# Patient Record
Sex: MALE | Race: WHITE | NOT HISPANIC OR LATINO | Employment: FULL TIME | URBAN - METROPOLITAN AREA
[De-identification: names, ages, dates, MRNs, and addresses within clinical notes are randomized per-mention and may not be internally consistent; named-entity substitution may affect disease eponyms.]

---
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
## 2023-05-26 PROBLEM — S75.009A: Status: ACTIVE | Noted: 2023-05-26

## 2023-05-26 PROBLEM — S82.102B: Status: ACTIVE | Noted: 2023-05-26

## 2023-05-26 PROBLEM — D72.829 LEUKOCYTOSIS: Status: ACTIVE | Noted: 2023-05-26

## 2023-05-26 PROBLEM — E87.6 HYPOKALEMIA: Status: ACTIVE | Noted: 2023-05-26

## 2023-05-28 PROBLEM — R74.8 ELEVATED CK: Status: ACTIVE | Noted: 2023-05-28

## 2023-05-29 PROBLEM — I47.20 VENTRICULAR TACHYCARDIA: Status: ACTIVE | Noted: 2023-05-29

## 2023-05-30 PROBLEM — I26.99 ACUTE PULMONARY EMBOLISM WITHOUT ACUTE COR PULMONALE: Status: ACTIVE | Noted: 2023-05-30

## 2023-05-31 PROBLEM — D72.829 LEUKOCYTOSIS: Status: RESOLVED | Noted: 2023-05-26 | Resolved: 2023-05-31

## 2023-05-31 PROBLEM — R00.0 TACHYCARDIA: Status: ACTIVE | Noted: 2023-05-31

## 2023-05-31 PROBLEM — S75.002A: Status: ACTIVE | Noted: 2023-05-31

## 2023-06-01 PROBLEM — E87.6 HYPOKALEMIA: Status: RESOLVED | Noted: 2023-05-26 | Resolved: 2023-06-01

## 2023-06-01 PROBLEM — R50.9 FEVER: Status: ACTIVE | Noted: 2023-06-01

## 2023-06-02 PROBLEM — K59.03 DRUG-INDUCED CONSTIPATION: Status: ACTIVE | Noted: 2023-06-02

## 2023-06-02 PROBLEM — I47.20 VENTRICULAR TACHYCARDIA: Status: RESOLVED | Noted: 2023-05-29 | Resolved: 2023-06-02

## 2023-06-02 PROBLEM — D72.825 BANDEMIA: Status: ACTIVE | Noted: 2023-06-02

## 2023-06-02 PROBLEM — R74.8 ELEVATED CK: Status: RESOLVED | Noted: 2023-05-28 | Resolved: 2023-06-02

## 2023-06-03 PROBLEM — D62 ACUTE BLOOD LOSS ANEMIA: Status: ACTIVE | Noted: 2023-06-03

## 2023-06-03 PROBLEM — D62 ACUTE BLOOD LOSS ANEMIA: Status: RESOLVED | Noted: 2023-06-03 | Resolved: 2023-06-03

## 2023-06-03 PROBLEM — D75.839 THROMBOCYTOSIS: Status: ACTIVE | Noted: 2023-06-03

## 2023-06-05 PROBLEM — D75.839 THROMBOCYTOSIS: Status: RESOLVED | Noted: 2023-06-03 | Resolved: 2023-06-05

## 2023-06-05 PROBLEM — R50.9 FEVER: Status: RESOLVED | Noted: 2023-06-01 | Resolved: 2023-06-05

## 2023-06-06 ENCOUNTER — PATIENT OUTREACH (OUTPATIENT)
Dept: ADMINISTRATIVE | Facility: CLINIC | Age: 38
End: 2023-06-06

## 2023-06-06 NOTE — PROGRESS NOTES
C3 nurse attempted to contact Cesario Gray  for a TCC post hospital discharge follow up call. No answer. The patient does not have a scheduled HOSFU appointment, and the pt does not have an [facility] PCP.

## 2023-06-07 NOTE — PROGRESS NOTES
C3 nurse spoke with Cesario Gray  for a TCC post hospital discharge follow up call. The patient reports does not have a scheduled HOSFU appointment. C3 nurse was unable to schedule [facility]OSFU appointment [facility]. No PCP listed.

## 2023-06-09 ENCOUNTER — NURSE TRIAGE (OUTPATIENT)
Dept: ADMINISTRATIVE | Facility: CLINIC | Age: 38
End: 2023-06-09

## 2023-06-21 ENCOUNTER — NURSE TRIAGE (OUTPATIENT)
Dept: ADMINISTRATIVE | Facility: CLINIC | Age: 38
End: 2023-06-21

## 2023-06-21 NOTE — TELEPHONE ENCOUNTER
Pt is in [place]. He had surgery like 3 1/2 weeks ago. Saw Ortho Md last week. One of his stiches is causing harsh nerve pain. Pt wants to know if he could snip his suture at the bottom to relieve the pain. Care advice call be transferred to MD. Please call and advise pt now.   Reason for Disposition   Caller has URGENT question and triager unable to answer question    Additional Information   Negative: Major abdominal surgical incision and wound gaping open with visible internal organs   Negative: Sounds like a life-threatening emergency to the triager   Negative: Bleeding from incision and won't stop after 10 minutes of direct pressure   Negative: Bleeding (more than a few drops) from incision and after blood vessel surgery (e.g., carotidendarterectomy, femoral bypass graft, kidney dialysis fistula, tracheostomy)   Negative: Bright red, wide-spread, sunburn-like rash   Negative: SEVERE pain in the incision   Negative: Incision gaping open and < 2 days (48 hours) since wound re-opened   Negative: Incision gaping open and length of opening > 2 inches (5 cm)   Negative: Patient sounds very sick or weak to the triager   Negative: Sounds like a serious complication to the triager   Negative: Fever > 100.4 F (38.0 C)   Negative: Incision looks infected (spreading redness, pain)   Negative: Red streak runs from the incision and longer than 1 inch (2.5 cm)   Negative: Pus or bad-smelling fluid draining from incision   Negative: Dressing soaked with blood or body fluid (e.g., drainage)   Negative: Raised bruise and size > 2 inches (5 cm) and expanding   Negative: Scant bleeding (e.g., few drops) from incision and after blood vessel surgery (e.g., carotid endarterectomy, femoral bypass graft, kidney dialysis fistula    Protocols used: Post-Op Incision Symptoms and [id number]

## 2023-07-28 PROBLEM — S75.009D: Status: ACTIVE | Noted: 2023-05-26

## 2023-09-04 PROBLEM — I26.99 ACUTE PULMONARY EMBOLISM WITHOUT ACUTE COR PULMONALE: Status: RESOLVED | Noted: 2023-05-30 | Resolved: 2023-09-04

## 2024-07-22 NOTE — TELEPHONE ENCOUNTER
[place]    PCP:  None    S/P discharge Monday after Lt Tibia intramedullary curt insertion on 5/27 with Dr. Cohen.  C/O swelling Lt foot, Lt calf swelling (twice the size of the Rt calf), unable to walk/weight bear on Lt leg d/t unable to extend Lt knee fully, and Lt foot is turning dark when standing.  Denies able to walk/weight bear on Lt leg d/t unable to extend Lt knee fully, fever, bleeding, redness, foul odor, vomiting, purulent drainage, painful urination, and constipation.  He is taking Percocet 5-325 mg Q 6 hrs prn.  He took last Toradol last night or this morning.  He reports more feeling in the toes than previously.  He reports the swelling is down from what it was a couple of days ago.  He wants to know what else he can take with the Eliquis for inflammation/swelling.  Per protocol, care advised is callback by Surgeon today.  Pt VU.  Message routed high priority to Provider.  Advised to call for worsening/questions/concerns.  VU.     Reason for Disposition   Caller has NON-URGENT question and triager unable to answer question    Additional Information   Negative: Sounds like a life-threatening emergency to the triager   Negative: Bright red, wide-spread, sunburn-like rash   Negative: SEVERE headache and after spinal (epidural) anesthesia   Negative: Vomiting and persists > 4 hours   Negative: Vomiting and abdomen looks much more swollen than usual   Negative: Drinking very little and dehydration suspected (e.g., no urine > 12 hours, very dry mouth, very lightheaded)   Negative: Patient sounds very sick or weak to the triager   Negative: Sounds like a serious complication to the triager   Negative: Fever > 100.4 F (38.0 C)   Negative: Caller has URGENT question and triager unable to answer question   Negative: SEVERE post-op pain (e.g., excruciating, pain scale 8-10) that is not controlled with pain medications   Negative: Headache and after spinal (epidural) anesthesia and not severe   Negative: Fever present  > 3 days (72 hours)   Negative: Patient wants to be seen   Negative: MILD TO MODERATE post-op pain (e.g., pain scale 1-7) that is not controlled with pain medications    Protocols used: Post-Op Symptoms and [id number]     No